# Patient Record
Sex: MALE
[De-identification: names, ages, dates, MRNs, and addresses within clinical notes are randomized per-mention and may not be internally consistent; named-entity substitution may affect disease eponyms.]

---

## 2018-07-27 ENCOUNTER — HOSPITAL ENCOUNTER (OUTPATIENT)
Dept: HOSPITAL 72 - SUR | Age: 58
Discharge: HOME | End: 2018-07-27
Payer: COMMERCIAL

## 2018-07-27 VITALS — DIASTOLIC BLOOD PRESSURE: 77 MMHG | SYSTOLIC BLOOD PRESSURE: 111 MMHG

## 2018-07-27 VITALS — DIASTOLIC BLOOD PRESSURE: 79 MMHG | SYSTOLIC BLOOD PRESSURE: 112 MMHG

## 2018-07-27 VITALS — SYSTOLIC BLOOD PRESSURE: 121 MMHG | DIASTOLIC BLOOD PRESSURE: 82 MMHG

## 2018-07-27 VITALS — DIASTOLIC BLOOD PRESSURE: 82 MMHG | SYSTOLIC BLOOD PRESSURE: 116 MMHG

## 2018-07-27 VITALS — WEIGHT: 179 LBS | HEIGHT: 67 IN | BODY MASS INDEX: 28.09 KG/M2

## 2018-07-27 VITALS — DIASTOLIC BLOOD PRESSURE: 81 MMHG | SYSTOLIC BLOOD PRESSURE: 114 MMHG

## 2018-07-27 VITALS — SYSTOLIC BLOOD PRESSURE: 110 MMHG | DIASTOLIC BLOOD PRESSURE: 75 MMHG

## 2018-07-27 DIAGNOSIS — R10.9: Primary | ICD-10-CM

## 2018-07-27 DIAGNOSIS — I10: ICD-10-CM

## 2018-07-27 DIAGNOSIS — K29.50: ICD-10-CM

## 2018-07-27 DIAGNOSIS — K21.9: ICD-10-CM

## 2018-07-27 PROCEDURE — 43239 EGD BIOPSY SINGLE/MULTIPLE: CPT

## 2018-07-27 PROCEDURE — 43250 EGD CAUTERY TUMOR POLYP: CPT

## 2018-07-27 PROCEDURE — 94003 VENT MGMT INPAT SUBQ DAY: CPT

## 2018-07-27 PROCEDURE — 94150 VITAL CAPACITY TEST: CPT

## 2018-07-27 NOTE — DISCHARGE INSTRUCTIONS
Discharge Instructions


Discharge Instructions


Follow up with:  See the doctor in the office after two weeks in the office.





For Congestive Heart Failure


Reminder


Report to your physician any weight gain of 5 pounds or more in one week.











Solomon Wood MD Jul 27, 2018 08:18

## 2018-07-27 NOTE — SHORT STAY SURGERY H&P
History of Present Illness


History of Present Illness


MARISOL Jacques is a 57 year old male who was admitted on  for GERD and abdominal 

pains





Patient History


Allergies:  


Coded Allergies:  


     No Known Allergies (Unverified , 7/27/18)


PAST MEDICAL HISTORY:  


(1) History of ankle surgery


(2) Hypertension





Review of Systems


Cardiovascular:  Reports: no symptoms


Respiratory:  Reports: no symptoms


Skeletal:  Reports: trauma


Gastrointestinal:  Reports: gastro esophageal reflux disease


Genitourinary:  Reports: no symptoms


Neurologic:  Reports: no symptoms


Endocrine:  Reports: no symptoms


Hematologic:  Reports: no symptoms





Physical Exam


Skin:  normal


HENT:  normal


Heart:  normal


Lungs:  normal


Abdomen:  abnormal


Extremities:  normal


Genitourinary:  normal





Plan


Plan of Care


Upper GI endoscopy with biopsy


Preop Interventions


None.


Summary of Findings


See the reports


Attestation


Are the patient's medical conditions optimized for surgery?


Attestation Response:  yes











Solomon Wood MD Jul 27, 2018 07:31

## 2018-07-27 NOTE — ENDOSCOPY PROCEDURE NOTE
Endoscopy Procedure Note


General


Indication for Procedure:  Abdominal pains/GERDS


Procedures Performed:  EGD - Mild Gastritis. 5 mm pedunculated inflammatory 

polyp found in the antral prepyloric area over the anterior wall that was 

removed by hot snare and a random biopsy of the gastric body was also obtained.


Specimen:  yes


Pt Tolerated Procedure Well:  Yes


Estimated Blood Loss:  none





Anesthesia


Anesthesiologist:  Dr. Rod


Anesthesia:  moderate sedation





Medications


Medication Given:  see anesthesia record





Inserted Devices


Implant(s) used?:  No





Quality


Quality of Bowel Preparation:  Excellent


Was there any complications?:  No





GI Core Measures


50 yrs or older w/o bx or poly:  Not Applicable


10yrs. F/U not recommended:  Not Applicable


If not recommended, why?:  


Med reason:<3 yrs.:  


System Reason:<3 yrs.:  











Solomon Wood MD Jul 27, 2018 08:18

## 2018-07-27 NOTE — ANETHESIA PREOPERATIVE EVAL
Anesthesia Pre-op PMH/ROS


General


Date of Evaluation:  Jul 27, 2018


Time of Evaluation:  07:05


Anesthesiologist:  masha


ASA Score:  ASA 3


Mallampati Score


Class I : Soft palate, uvula, fauces, pillars visible


Class II: Soft palate, uvula, fauces visible


Class III: Soft palate, base of uvula visible


Class IV: Only hard plate visible


Mallampati Classification:  Class II


Surgeon:  hussain


Diagnosis:  gerd


Surgical Procedure:  egd


Anesthesia History:  none


Family History:  no anesthesia problems


Allergies:  


Coded Allergies:  


     No Known Allergies (Unverified , 7/26/18)


Medications:  see eMAR





Past Medical History


Cardiovascular:  Reports: HTN


Gastrointestinal/Genitourinary:  Reports: GERD





Anesthesia Pre-op Phys. Exam


Physician Exam


Constitutional:  NAD


Neurologic:  CN 2-12 intact


Cardiovascular:  RRR


Respiratory:  CTA


Gastrointestinal:  S/NT/ND





Airway Exam


Mallampati Score:  Class II


MO:  full


Neck:  supple


TMD:  2fb


ROM:  full





Anesthesia Pre-op A/P


Risk Assessment & Plan


Assessment:


asa3


Plan:


mac


Status Change Before Surgery:  No





Pre-Antibiotics


Drug:  Brooklyn Jones MD Jul 27, 2018 07:06

## 2018-07-27 NOTE — OPERATIVE NOTE - DICTATED
DATE OF OPERATION:  07/27/2018



SURGEON:  Solomon Wood M.D.



PROCEDURE:  Esophagogastroduodenoscopy with polypectomy and biopsy.



PREOPERATIVE DIAGNOSES:  Abdominal pain and history of gastroesophageal

reflux, rule out peptic ulcer disease.



POSTOPERATIVE DIAGNOSES:

1. Mild generalized gastritis.

2. A 5 mm inflammatory pedunculated polypoid lesion found over the

pyloric antral area located over the anterior wall of the stomach, which

was removed with hot snare.  A random biopsy of gastric body was also

obtained.



MEDICATION USED:  Per Dr. Rod, anesthesiologist.



INSTRUMENT:  GIF Olympus upper GI video endoscope.



DESCRIPTION OF PROCEDURE:  The patient after arriving endoscopy unit, was

told about risks and benefits of the procedure, which he accepted and

signed informed consent.



At this time, the scope was gradually passed through the cricopharyngeal

area, was lodged into the upper esophagus, and gradually advanced towards

gastroesophageal junction.  The entire length of the esophagus looked

normal.  No evidence of inflammatory process, ulceration, stricture, etc.

was found.  GE junction also looked normal.



The scope, at this time, was introduced into the stomach.  Gastric

cavity was distended and gradually the areas of the fundus and the body

and the antrum were examined, which revealed evidence of mild inflammatory

process with erythema and edema of the gastric folds of minimal degree

consistent with mild gastritis.  One random biopsy from gastric body

obtained and subsequently the scope was forwarded towards the antrum and

prepyloric area where incidentally, a 5 mm pedunculated polypoid lesion

was encountered, which was located over the anterior wall of the stomach

over the lesser curvature.  This looked benign and looked inflammatory in

type with irritability and easy friability of the surface of the polyp.

This polyp was grabbed with hot snare forceps and it was gradually cut out

and brought out as the site of the polypectomy remained to be quite normal

without any evidence of bleeding.  Finally, the scope was passed through

the pylorus.  First and second portion of duodenum were found to be

completely normal.  At this time, the scope was pulled back into the

stomach and retroflexion maneuver was applied.  The area of the

gastroesophageal junction was examined in a closer fashion, which also

revealed normal findings.  Finally, the scope was pulled out and the

procedure was terminated.  The patient tolerated the procedure well and

left the endoscopy room in a good condition.









  ______________________________________________

  Said YVES Wood





DR:  ALEXIS

D:  07/27/2018 08:28

T:  07/27/2018 16:10

JOB#:  [id number]

CC:

## 2018-07-27 NOTE — IMMEDIATE POST-OP EVALUATION
Immediate Post-Op Evalulation


Immediate Post-Op Evalulation


Procedure:  egd w/bx


Date of Evaluation:  Jul 27, 2018


Time of Evaluation:  08:30


IV Fluids:  150ml 0.9ns


Blood Products:  none


Estimated Blood Loss:  negligible


Blood Pressure Systolic:  114


Blood Pressure Diastolic:  81


Pulse Rate:  65


Respiratory Rate:  18


O2 Sat by Pulse Oximetry:  100


Temperature (Fahrenheit):  97.5


Pain Score (1-10):  0


Nausea:  No


Vomiting:  No


Complications


none


Patient Status:  awake, reacts, patent


Hydration Status:  adequate


Drug:  Brooklyn Jones MD Jul 27, 2018 08:30

## 2018-07-27 NOTE — 48 HOUR POST ANESTHESIA EVAL
Post Anesthesia Evaluation


Procedure:  egd w/bx


Date of Evaluation:  Jul 27, 2018


Time of Evaluation:  08:32


Blood Pressure Systolic:  116


0:  82


Pulse Rate:  64


Respiratory Rate:  18


Temperature (Fahrenheit):  97.5


O2 Sat by Pulse Oximetry:  100


Airway:  patent


Nausea:  No


Vomiting:  No


Pain Intensity:  0


Hydration Status:  adequate


Cardiopulmonary Status:


stable


Mental Status/LOC:  patient returned to baseline


Post-Anesthesia Complications:


none


Follow-up care needed:  N/A











Brooklyn Domingo MD Jul 27, 2018 08:32

## 2018-07-27 NOTE — PRE-PROCEDURE NOTE/ATTESTATION
Pre-Procedure Note/Attestation


Complete Prior to Procedure


Planned Procedure:  left


Procedure Narrative:


Examination of the upper GI tract via Endoscopy





Indications for Procedure


Pre-Operative Diagnosis:


R/O Peptic ulcer/Gastritis.





Attestation


I attest that I discussed the nature of the procedure; its benefits; risks and 

complications; and alternatives (and the risks and benefits of such alternatives

), prior to the procedure, with the patient (or the patient's legal 

representative).





I attest that, if there was a reasonable possibility of needing a blood 

transfusion, the patient (or the patient's legal representative) was given the 

[facility] standardized written summary, pursuant 

to the Payam [place]armora Blood Safety Act ([place] Health and Safety Code # 1645, as 

amended).





I attest that I re-evaluated the patient just prior to the surgery and that 

there has been no change in the patient's H&P, except as documented below:











Solomon Wood MD Jul 27, 2018 07:32

## 2018-07-27 NOTE — PRE-OP HX & PHY REPO 2 SIG
DATE OF ADMISSION:  07/27/2018



HISTORY OF PRESENT ILLNESS:  The patient is a 57-year-old gentleman, who is

being seen prior to undergoing the procedure of upper GI endoscopy for

which he has been scheduled to receive an evaluation of his

gastrointestinal symptoms that he has been complaining subsequent to his

work injury.  The applicant basically is complaining of experiencing pain

over the upper part of the abdomen and epigastric area, which also

radiates towards his chest.  He reports that these symptoms are aggravated

by consumption of food and they happened intermittently on a daily basis.

They lasted approximately 15 to 20 minutes.  The applicant denies having

had any history of hematemesis or melena, but occasionally minimal rectal

bleeding, which is not important.  He also reports that he does have

nausea and symptoms of regurgitation.  The applicant does have heartburn,

which is moderate in an intensity and it does not seem that in the past he

was being prescribed any medication as such.  It is important to mention

that subsequent to the applicant's work injury, he was started on multiple

medications including nonsteroidal anti-inflammatory agents.  Subsequent

to which, the patient describes that his symptoms gradually started to

reveal and this has been almost 8 to 10 months that he has been

experiencing these symptoms.



The applicant did have injury at work, he was working as an  and

as such he had fracture of his right ankle.  At his job, he was

functioning as a  of the [employer] operation.



PAST MEDICAL HISTORY:  The patient has had history of hypertension, but

denies any other conditions such as diabetes, arthritis, hyperlipidemia,

etc.



PAST SURGICAL HISTORY:  As I mentioned, was of treatment and surgery for

the right ankle that happened at work site.



ALLERGIES:  He had allergies to dust and pollen.



HABITS:  The applicant denies drinking alcohol or smoking cigarettes.



PRESENT MEDICATIONS:  Aspirin 81 mg, benazepril, and hydrochlorothiazide,

and he also is still taking over-the-counter NSAIDs such as Advil.  The

patient also has to take Norco for controlling his pain.  He also takes

Neurontin.



REVIEW OF SYSTEMS:  Basically history of present illness.



PHYSICAL EXAMINATION:

GENERAL:  Reveals alert and oriented gentleman, does not seem to be in any

acute distress.

VITAL SIGNS:  All stable.

HEENT:  Normocephalic.  There is no evidence of jaundice.  The pupils are

reactive to light and accommodation.  Buccal cavity, tongue midline, well

hydrated.  No ulcers.

NECK:  Supple.  No JVD or thyromegaly.

CHEST:  Clear to auscultation and percussion.  No rales or rhonchi.

 

HEART:  S1, S2 normal.  Regular rhythm.  No gallops or murmur.

ABDOMEN:  Soft.  There is some minimal tenderness over upper part of the

abdomen, but there is no organomegaly.  No masses noted.

EXTREMITIES:  Unremarkable.

NEUROLOGIC:  Unremarkable.



PRELIMINARY IMPRESSION:

1. Abdominal pain, epigastric pain of uncertain etiology, rule out

NSAID-induced gastropathy, rule out peptic ulcer disease, gastritis,

duodenitis, etc.

2. History of bodily injury.  Orthopedic diagnosis work-related.



RECOMMENDATION:  The applicant at this time seems to be quite stable to

undergo the procedure of upper GI endoscopy for which he has been

scheduled.  He understands the risks and benefits and will sign the

consent.









  ______________________________________________

  Said YVES Wood





DR:  SERGIO

D:  07/27/2018 08:25

T:  07/27/2018 23:19

JOB#:  [id number]

CC: